# Patient Record
Sex: FEMALE
[De-identification: names, ages, dates, MRNs, and addresses within clinical notes are randomized per-mention and may not be internally consistent; named-entity substitution may affect disease eponyms.]

---

## 2018-01-02 PROBLEM — Z00.00 ENCOUNTER FOR PREVENTIVE HEALTH EXAMINATION: Status: ACTIVE | Noted: 2018-01-02

## 2018-01-10 ENCOUNTER — TRANSCRIPTION ENCOUNTER (OUTPATIENT)
Age: 43
End: 2018-01-10

## 2018-01-10 ENCOUNTER — APPOINTMENT (OUTPATIENT)
Dept: OTOLARYNGOLOGY | Facility: CLINIC | Age: 43
End: 2018-01-10
Payer: COMMERCIAL

## 2018-01-10 VITALS
TEMPERATURE: 98.5 F | HEART RATE: 96 BPM | WEIGHT: 145 LBS | BODY MASS INDEX: 24.16 KG/M2 | DIASTOLIC BLOOD PRESSURE: 75 MMHG | HEIGHT: 65 IN | SYSTOLIC BLOOD PRESSURE: 125 MMHG

## 2018-01-10 DIAGNOSIS — Z82.49 FAMILY HISTORY OF ISCHEMIC HEART DISEASE AND OTHER DISEASES OF THE CIRCULATORY SYSTEM: ICD-10-CM

## 2018-01-10 DIAGNOSIS — Z83.3 FAMILY HISTORY OF DIABETES MELLITUS: ICD-10-CM

## 2018-01-10 DIAGNOSIS — Z86.39 PERSONAL HISTORY OF OTHER ENDOCRINE, NUTRITIONAL AND METABOLIC DISEASE: ICD-10-CM

## 2018-01-10 DIAGNOSIS — Z80.9 FAMILY HISTORY OF MALIGNANT NEOPLASM, UNSPECIFIED: ICD-10-CM

## 2018-01-10 DIAGNOSIS — Z87.891 PERSONAL HISTORY OF NICOTINE DEPENDENCE: ICD-10-CM

## 2018-01-10 DIAGNOSIS — E04.2 NONTOXIC MULTINODULAR GOITER: ICD-10-CM

## 2018-01-10 PROCEDURE — 99204 OFFICE O/P NEW MOD 45 MIN: CPT | Mod: 25

## 2018-01-10 PROCEDURE — 31575 DIAGNOSTIC LARYNGOSCOPY: CPT

## 2018-03-07 NOTE — ASU PATIENT PROFILE, ADULT - PSH
History of surgery  left knee History of surgery  R knee plastic surgery 1993  History of surgery  left knee 30 years ago

## 2018-03-08 ENCOUNTER — APPOINTMENT (OUTPATIENT)
Dept: OTOLARYNGOLOGY | Facility: HOSPITAL | Age: 43
End: 2018-03-08
Payer: COMMERCIAL

## 2018-03-08 ENCOUNTER — RESULT REVIEW (OUTPATIENT)
Age: 43
End: 2018-03-08

## 2018-03-08 ENCOUNTER — OUTPATIENT (OUTPATIENT)
Dept: OUTPATIENT SERVICES | Facility: HOSPITAL | Age: 43
LOS: 1 days | Discharge: HOME | End: 2018-03-08
Payer: COMMERCIAL

## 2018-03-08 VITALS
DIASTOLIC BLOOD PRESSURE: 61 MMHG | HEIGHT: 65 IN | OXYGEN SATURATION: 100 % | WEIGHT: 145.06 LBS | HEART RATE: 76 BPM | TEMPERATURE: 98 F | SYSTOLIC BLOOD PRESSURE: 131 MMHG | RESPIRATION RATE: 16 BRPM

## 2018-03-08 DIAGNOSIS — Z98.890 OTHER SPECIFIED POSTPROCEDURAL STATES: Chronic | ICD-10-CM

## 2018-03-08 LAB
CALCIUM SERPL-MCNC: 8.9 MG/DL — SIGNIFICANT CHANGE UP (ref 8.4–10.5)
PTH-INTACT IO % DIF SERPL: 11.4 PG/ML — SIGNIFICANT CHANGE UP (ref 8.5–72.5)

## 2018-03-08 PROCEDURE — 95865 NEEDLE EMG LARYNX: CPT | Mod: 26

## 2018-03-08 PROCEDURE — 60240 REMOVAL OF THYROID: CPT

## 2018-03-08 PROCEDURE — 60240 REMOVAL OF THYROID: CPT | Mod: 80

## 2018-03-08 RX ORDER — BENZOCAINE AND MENTHOL 5; 1 G/100ML; G/100ML
1 LIQUID ORAL
Qty: 0 | Refills: 0 | Status: DISCONTINUED | OUTPATIENT
Start: 2018-03-08 | End: 2018-03-09

## 2018-03-08 RX ORDER — ONDANSETRON 8 MG/1
4 TABLET, FILM COATED ORAL EVERY 4 HOURS
Qty: 0 | Refills: 0 | Status: DISCONTINUED | OUTPATIENT
Start: 2018-03-08 | End: 2018-03-09

## 2018-03-08 RX ORDER — ACETAMINOPHEN 500 MG
650 TABLET ORAL EVERY 6 HOURS
Qty: 0 | Refills: 0 | Status: DISCONTINUED | OUTPATIENT
Start: 2018-03-08 | End: 2018-03-09

## 2018-03-08 RX ORDER — SODIUM CHLORIDE 9 MG/ML
1000 INJECTION, SOLUTION INTRAVENOUS
Qty: 0 | Refills: 0 | Status: DISCONTINUED | OUTPATIENT
Start: 2018-03-08 | End: 2018-03-09

## 2018-03-08 RX ORDER — LEVOTHYROXINE SODIUM 125 MCG
100 TABLET ORAL DAILY
Qty: 0 | Refills: 0 | Status: DISCONTINUED | OUTPATIENT
Start: 2018-03-09 | End: 2018-03-09

## 2018-03-08 RX ORDER — OXYCODONE HYDROCHLORIDE 5 MG/1
5 TABLET ORAL EVERY 4 HOURS
Qty: 0 | Refills: 0 | Status: DISCONTINUED | OUTPATIENT
Start: 2018-03-08 | End: 2018-03-09

## 2018-03-08 RX ORDER — MORPHINE SULFATE 50 MG/1
2 CAPSULE, EXTENDED RELEASE ORAL
Qty: 0 | Refills: 0 | Status: DISCONTINUED | OUTPATIENT
Start: 2018-03-08 | End: 2018-03-09

## 2018-03-08 RX ORDER — ALBUTEROL 90 UG/1
2.5 AEROSOL, METERED ORAL EVERY 6 HOURS
Qty: 0 | Refills: 0 | Status: DISCONTINUED | OUTPATIENT
Start: 2018-03-08 | End: 2018-03-09

## 2018-03-08 RX ORDER — CALCIUM CARBONATE 500(1250)
2 TABLET ORAL EVERY 8 HOURS
Qty: 0 | Refills: 0 | Status: DISCONTINUED | OUTPATIENT
Start: 2018-03-08 | End: 2018-03-09

## 2018-03-08 RX ADMIN — BENZOCAINE AND MENTHOL 1 LOZENGE: 5; 1 LIQUID ORAL at 18:08

## 2018-03-08 RX ADMIN — BENZOCAINE AND MENTHOL 1 LOZENGE: 5; 1 LIQUID ORAL at 21:14

## 2018-03-08 RX ADMIN — OXYCODONE HYDROCHLORIDE 5 MILLIGRAM(S): 5 TABLET ORAL at 23:49

## 2018-03-08 RX ADMIN — ALBUTEROL 2.5 MILLIGRAM(S): 90 AEROSOL, METERED ORAL at 19:28

## 2018-03-08 NOTE — ASU PREOP CHECKLIST - SELECT TESTS ORDERED
EKG/CXR/INR/CBC/PT/PTT/CMP 3/8/2018 uhCG/CXR/CBC/INR/PT/PTT/CMP/EKG EKG/CXR/PT/PTT/INR/CBC/CMP/3/8/2018 uhCG negative

## 2018-03-08 NOTE — BRIEF OPERATIVE NOTE - PROCEDURE
<<-----Click on this checkbox to enter Procedure Autotransplantation, parathyroid  03/08/2018    Active  [facility]  Thyroidectomy, total  03/08/2018    [facility]

## 2018-03-08 NOTE — PROGRESS NOTE ADULT - SUBJECTIVE AND OBJECTIVE BOX
ENT H DAILY POST OP NOTE    s/p total thyroidectomy.     Post op doing well. Sat 100% on room air. Pain controlled. Denies nausea/vomiting, numbness or tingling in lips or fingertips      MEDICATIONS:  IV fluids:  lactated ringers. 1000 milliLiter(s) IV Continuous <Continuous>    Pain medications/Neuro:  morphine  - Injectable 2 milliGRAM(s) IV Push every 10 minutes PRN  ondansetron Injectable 4 milliGRAM(s) IV Push every 4 hours PRN    All other PRN medications:  benzocaine 15 mG/menthol 3.6 mG Lozenge 1 Lozenge Oral every 2 hours PRN    Vital Signs Last 24 Hrs  T(C): 36.2 (08 Mar 2018 15:45), Max: 36.6 (08 Mar 2018 08:44)  T(F): 97.2 (08 Mar 2018 15:45), Max: 97.8 (08 Mar 2018 08:44)  HR: 92 (08 Mar 2018 18:00) (76 - 96)  BP: 120/58 (08 Mar 2018 18:00) (103/52 - 131/61)  BP(mean): 80 (08 Mar 2018 18:00) (72 - 98)  RR: 19 (08 Mar 2018 18:00) (10 - 19)  SpO2: 97% (08 Mar 2018 18:00) (97% - 100%)    PHYSICAL EXAM:    ENT EXAM-   Constitutional: Well-developed, well-nourished.    Hoarse voice  Head:  normocephalic, atraumatic. negative chvostek  OC/OP:   Floor of mouth, buccal mucosa, lips, hard palate, soft palate, uvula, posterior pharyngeal wall normal.  Mucosa moist.  Neck:  Neck incision c/d/i, neck soft  Neuro/Psych:  A&O x 3.  Mood stable.  Affect appropriate  Cranial nerves: 2-12 grossly intact bilaterally.  Eyes:  EOMI, PERRL no nystagmus.  Pulm:  No dyspnea, non-labored breathing on room air  Cardiovascular: Carotid pulses 2+ bilaterally.  No peripheral edema.  Skin:  No rash or lesions on exposed skin of head/neck        Assessment/Plan:  42y Female s/p total thyroidectomy. Post op doing well.   - regular diet  - 7pm ca/iopth  - nebs  - pain control  - anti emetics prn  - ambulate        PPX: SCDs, Incentive spirometry    Dispo planning: likely dc tomorrow morning

## 2018-03-08 NOTE — PACU DISCHARGE NOTE - COMMENTS
pt aao x3.  VSS.  incision to base of anterior neck c/d/i; dermabond intact.  denies c/o pain at present.  cough still present; ENT aware.  report given to DARLEEN min on 9 wollman.  pt to go to [phone] via bed with transport

## 2018-03-09 VITALS
SYSTOLIC BLOOD PRESSURE: 106 MMHG | OXYGEN SATURATION: 97 % | TEMPERATURE: 99 F | DIASTOLIC BLOOD PRESSURE: 72 MMHG | RESPIRATION RATE: 16 BRPM | HEART RATE: 96 BPM

## 2018-03-09 PROBLEM — E03.9 HYPOTHYROIDISM, UNSPECIFIED: Chronic | Status: INACTIVE | Noted: 2018-03-07 | Resolved: 2018-03-08

## 2018-03-09 LAB
CALCIUM SERPL-MCNC: 8 MG/DL — LOW (ref 8.4–10.5)
PTH-INTACT IO % DIF SERPL: 16.4 PG/ML — SIGNIFICANT CHANGE UP (ref 8.5–72.5)

## 2018-03-09 PROCEDURE — 60240 REMOVAL OF THYROID: CPT

## 2018-03-09 PROCEDURE — 82310 ASSAY OF CALCIUM: CPT

## 2018-03-09 PROCEDURE — 36415 COLL VENOUS BLD VENIPUNCTURE: CPT

## 2018-03-09 PROCEDURE — 88307 TISSUE EXAM BY PATHOLOGIST: CPT

## 2018-03-09 PROCEDURE — 94640 AIRWAY INHALATION TREATMENT: CPT

## 2018-03-09 PROCEDURE — 92610 EVALUATE SWALLOWING FUNCTION: CPT | Mod: GN

## 2018-03-09 PROCEDURE — 88305 TISSUE EXAM BY PATHOLOGIST: CPT

## 2018-03-09 PROCEDURE — 88331 PATH CONSLTJ SURG 1 BLK 1SPC: CPT

## 2018-03-09 PROCEDURE — 83970 ASSAY OF PARATHORMONE: CPT

## 2018-03-09 RX ORDER — CALCITRIOL 0.5 UG/1
1 CAPSULE ORAL
Qty: 28 | Refills: 0 | OUTPATIENT
Start: 2018-03-09 | End: 2018-03-22

## 2018-03-09 RX ORDER — CALCIUM CARBONATE 500(1250)
2 TABLET ORAL
Qty: 84 | Refills: 0 | OUTPATIENT
Start: 2018-03-09 | End: 2018-03-22

## 2018-03-09 RX ADMIN — BENZOCAINE AND MENTHOL 1 LOZENGE: 5; 1 LIQUID ORAL at 13:26

## 2018-03-09 RX ADMIN — OXYCODONE HYDROCHLORIDE 5 MILLIGRAM(S): 5 TABLET ORAL at 00:45

## 2018-03-09 RX ADMIN — BENZOCAINE AND MENTHOL 1 LOZENGE: 5; 1 LIQUID ORAL at 06:38

## 2018-03-09 RX ADMIN — Medication 100 MICROGRAM(S): at 06:32

## 2018-03-09 RX ADMIN — ALBUTEROL 2.5 MILLIGRAM(S): 90 AEROSOL, METERED ORAL at 06:43

## 2018-03-09 NOTE — SWALLOW BEDSIDE ASSESSMENT ADULT - PHARYNGEAL PHASE
Suspect generally timely swallow trigger.  Swallow coordination & strength appeared partially reduced per cervical auscultation and laryngeal palpation.  Pt engaged in 3-4 swallows in rapid succession with thin liquid trials via cup in head neutral & L head turn posture, followed by a cough.  No overt signs & symptoms of airway protection deficits noted with 1/2 tsp amounts of thin liquid in head neutral posture & with single effortful swallow.  Puree and solid trials tolerated well.

## 2018-03-09 NOTE — SWALLOW BEDSIDE ASSESSMENT ADULT - SWALLOW EVAL: SECRETION MANAGEMENT
(+) intermittent dry coughing even without po.  Pt noted having dry cough for the past week./baseline cough

## 2018-03-09 NOTE — SWALLOW BEDSIDE ASSESSMENT ADULT - COMMENTS
Pt endorsed coughing and choking on thin liquids since surgery yesterday.  She has been only eating minimal amounts 2/2 fear of choking.

## 2018-03-09 NOTE — SWALLOW BEDSIDE ASSESSMENT ADULT - SLP GENERAL OBSERVATIONS
Pt received awake & alert, pleasant.  She endorsed mild difficulty getting air in due to "fullness" at the level of the throat but denied throat pain/discomfort.  Pt with moderate dysphonia with breathy and rough vocal quality.  Pt noted voice is slightly better today compared to yesterday.

## 2018-03-09 NOTE — SWALLOW BEDSIDE ASSESSMENT ADULT - SWALLOW EVAL: DIAGNOSIS
Patient presents with pharyngeal dysphagia with overt signs & symptoms of airway protection deficits on thin liquid trials.  Thin liquids tolerated when taken in 1/2 tsp amounts with effortful swallow.  Pt also presents with moderate dysphonia.  Dysphagia & dysphonia are likely sequelae of total thyroidectomy and are expected to improve over time.  For now, she is safe to continue on a regular diet with thin liquids with safe swallow strategies.

## 2018-03-09 NOTE — SWALLOW BEDSIDE ASSESSMENT ADULT - SWALLOW EVAL: RECOMMENDED FEEDING/EATING TECHNIQUES
maintain upright posture during/after eating for 30 mins/position upright (90 degrees)/hard swallow w/ each bite or sip/Take thin liquids via 1/2 teaspoon amounts only/small sips/bites

## 2018-03-09 NOTE — SWALLOW BEDSIDE ASSESSMENT ADULT - SPECIFY REASON(S)
Pt with persistent dysphonia and dysphagia to thin liquids s/p total thyroidectomy yesterday ( 3/8/18).

## 2018-03-09 NOTE — PROGRESS NOTE ADULT - SUBJECTIVE AND OBJECTIVE BOX
s/p total thyroidectomy. Seen on surgical floor. Pain controlled. Denies nausea/vomiting, numbness or tingling in lips or fingertips  Coughing with PO fluids. Minimal intake so far. No difficulties breathing overnight.     Vital Signs Last 24 Hrs  T(C): 36.6 (09 Mar 2018 05:15), Max: 37.3 (08 Mar 2018 20:00)  T(F): 97.9 (09 Mar 2018 05:15), Max: 99.1 (08 Mar 2018 20:00)  HR: 95 (09 Mar 2018 05:15) (76 - 110)  BP: 113/71 (09 Mar 2018 05:15) (103/52 - 131/61)  BP(mean): 92 (08 Mar 2018 19:00) (72 - 98)  RR: 17 (09 Mar 2018 05:15) (10 - 19)  SpO2: 99% (09 Mar 2018 05:15) (97% - 100%)    ENT EXAM-     Hoarse voice  negative chvostek  Neck:  Neck incision c/d/i, neck soft  Neuro/Psych:  A&O x 3.  Mood stable.  Affect appropriate  Cranial nerves: 2-12 grossly intact bilaterally.  Pulm:  No dyspnea, non-labored breathing on room air      Ca    8.9      08 Mar 2018 19:11      Assessment/Plan:  42y Female s/p total thyroidectomy. Post op doing well.   - regular diet  - 7pm ca/iopth  - nebs  - pain control  - anti emetics prn  - ambulate    PPX: SCDs, Incentive spirometry    Dispo planning: likely dc this morning

## 2018-03-13 PROBLEM — E78.00 PURE HYPERCHOLESTEROLEMIA, UNSPECIFIED: Chronic | Status: ACTIVE | Noted: 2018-03-07

## 2018-03-14 LAB — SURGICAL PATHOLOGY STUDY: SIGNIFICANT CHANGE UP

## 2018-03-23 ENCOUNTER — RX RENEWAL (OUTPATIENT)
Age: 43
End: 2018-03-23

## 2018-03-23 RX ORDER — CALCITRIOL 0.25 UG/1
0.25 CAPSULE, LIQUID FILLED ORAL TWICE DAILY
Qty: 14 | Refills: 0 | Status: ACTIVE | COMMUNITY
Start: 2018-03-23 | End: 1900-01-01

## 2018-03-26 ENCOUNTER — APPOINTMENT (OUTPATIENT)
Dept: OTOLARYNGOLOGY | Facility: CLINIC | Age: 43
End: 2018-03-26
Payer: COMMERCIAL

## 2018-03-26 VITALS
HEART RATE: 84 BPM | DIASTOLIC BLOOD PRESSURE: 80 MMHG | HEIGHT: 65 IN | TEMPERATURE: 98.4 F | SYSTOLIC BLOOD PRESSURE: 126 MMHG | WEIGHT: 145 LBS | BODY MASS INDEX: 24.16 KG/M2

## 2018-03-26 PROCEDURE — 99024 POSTOP FOLLOW-UP VISIT: CPT

## 2018-03-26 PROCEDURE — 31575 DIAGNOSTIC LARYNGOSCOPY: CPT | Mod: 58

## 2018-03-27 LAB
25(OH)D3 SERPL-MCNC: 31.4 NG/ML
CALCIUM SERPL-MCNC: 9.7 MG/DL
CALCIUM SERPL-MCNC: 9.7 MG/DL
PARATHYROID HORMONE INTACT: 22 PG/ML

## 2018-04-30 ENCOUNTER — APPOINTMENT (OUTPATIENT)
Dept: OTOLARYNGOLOGY | Facility: CLINIC | Age: 43
End: 2018-04-30

## 2018-04-30 ENCOUNTER — APPOINTMENT (OUTPATIENT)
Dept: OTOLARYNGOLOGY | Facility: CLINIC | Age: 43
End: 2018-04-30
Payer: COMMERCIAL

## 2018-04-30 VITALS
WEIGHT: 145 LBS | HEIGHT: 65 IN | OXYGEN SATURATION: 100 % | TEMPERATURE: 98 F | BODY MASS INDEX: 24.16 KG/M2 | SYSTOLIC BLOOD PRESSURE: 115 MMHG | HEART RATE: 77 BPM | DIASTOLIC BLOOD PRESSURE: 71 MMHG

## 2018-04-30 DIAGNOSIS — R49.0 DYSPHONIA: ICD-10-CM

## 2018-04-30 PROCEDURE — 31575 DIAGNOSTIC LARYNGOSCOPY: CPT | Mod: 58

## 2018-04-30 PROCEDURE — 99024 POSTOP FOLLOW-UP VISIT: CPT

## 2018-06-13 ENCOUNTER — APPOINTMENT (OUTPATIENT)
Dept: OTOLARYNGOLOGY | Facility: CLINIC | Age: 43
End: 2018-06-13
Payer: COMMERCIAL

## 2018-06-13 VITALS
HEART RATE: 75 BPM | WEIGHT: 145 LBS | HEIGHT: 65 IN | TEMPERATURE: 98.8 F | DIASTOLIC BLOOD PRESSURE: 83 MMHG | SYSTOLIC BLOOD PRESSURE: 126 MMHG | OXYGEN SATURATION: 99 % | BODY MASS INDEX: 24.16 KG/M2

## 2018-06-13 DIAGNOSIS — C73 MALIGNANT NEOPLASM OF THYROID GLAND: ICD-10-CM

## 2018-06-13 DIAGNOSIS — R06.02 SHORTNESS OF BREATH: ICD-10-CM

## 2018-06-13 PROCEDURE — 31575 DIAGNOSTIC LARYNGOSCOPY: CPT

## 2018-06-13 PROCEDURE — 99213 OFFICE O/P EST LOW 20 MIN: CPT | Mod: 25

## 2018-06-15 PROBLEM — R06.02 SHORTNESS OF BREATH: Status: ACTIVE | Noted: 2018-06-15

## 2018-06-15 PROBLEM — C73 PAPILLARY THYROID CARCINOMA: Status: ACTIVE | Noted: 2018-03-26

## 2023-11-29 ENCOUNTER — TRANSCRIPTION ENCOUNTER (OUTPATIENT)
Age: 48
End: 2023-11-29